# Patient Record
Sex: FEMALE | Race: OTHER | NOT HISPANIC OR LATINO
[De-identification: names, ages, dates, MRNs, and addresses within clinical notes are randomized per-mention and may not be internally consistent; named-entity substitution may affect disease eponyms.]

---

## 2022-07-25 ENCOUNTER — FORM ENCOUNTER (OUTPATIENT)
Age: 52
End: 2022-07-25

## 2022-12-20 PROBLEM — Z00.00 ENCOUNTER FOR PREVENTIVE HEALTH EXAMINATION: Status: ACTIVE | Noted: 2022-12-20

## 2023-01-10 ENCOUNTER — APPOINTMENT (OUTPATIENT)
Dept: ORTHOPEDIC SURGERY | Facility: CLINIC | Age: 53
End: 2023-01-10
Payer: MEDICAID

## 2023-01-10 VITALS — WEIGHT: 112 LBS | BODY MASS INDEX: 21.99 KG/M2 | HEIGHT: 60 IN

## 2023-01-10 DIAGNOSIS — R56.9 UNSPECIFIED CONVULSIONS: ICD-10-CM

## 2023-01-10 DIAGNOSIS — M71.21 SYNOVIAL CYST OF POPLITEAL SPACE [BAKER], RIGHT KNEE: ICD-10-CM

## 2023-01-10 DIAGNOSIS — Z87.39 PERSONAL HISTORY OF OTHER DISEASES OF THE MUSCULOSKELETAL SYSTEM AND CONNECTIVE TISSUE: ICD-10-CM

## 2023-01-10 DIAGNOSIS — M25.561 PAIN IN RIGHT KNEE: ICD-10-CM

## 2023-01-10 DIAGNOSIS — M81.8 OTHER OSTEOPOROSIS W/OUT CURRENT PATHOLOGICAL FRACTURE: ICD-10-CM

## 2023-01-10 DIAGNOSIS — M25.562 PAIN IN RIGHT KNEE: ICD-10-CM

## 2023-01-10 DIAGNOSIS — M71.22 SYNOVIAL CYST OF POPLITEAL SPACE [BAKER], LEFT KNEE: ICD-10-CM

## 2023-01-10 DIAGNOSIS — Z78.9 OTHER SPECIFIED HEALTH STATUS: ICD-10-CM

## 2023-01-10 PROCEDURE — 99203 OFFICE O/P NEW LOW 30 MIN: CPT | Mod: 25

## 2023-01-10 PROCEDURE — 20612 ASPIRATE/INJ GANGLION CYST: CPT

## 2023-01-10 RX ORDER — MELOXICAM 15 MG/1
TABLET ORAL
Refills: 0 | Status: ACTIVE | COMMUNITY

## 2023-01-10 RX ORDER — PREGABALIN 300 MG/1
CAPSULE ORAL
Refills: 0 | Status: ACTIVE | COMMUNITY

## 2023-01-10 RX ORDER — BUTALB/ACETAMINOPHEN/CAFFEINE 50-325-40
TABLET ORAL
Refills: 0 | Status: ACTIVE | COMMUNITY

## 2023-01-10 RX ORDER — QUETIAPINE FUMARATE 25 MG/1
25 TABLET ORAL
Refills: 0 | Status: ACTIVE | COMMUNITY

## 2023-01-10 NOTE — HISTORY OF PRESENT ILLNESS
[FreeTextEntry1] : This is a 52-year-old female who comes in complaining of bilateral knee pain in association with her low back pain.  She has known spinal stenosis as well as known bilateral popliteal cysts.  She also has known meniscal tears and some degenerative changes.  She was told by another doctor that they would not do a resection on her popliteal cyst and was sent to me for further evaluation.  She has pain when she is kneeling. [Worsening] : worsening [8] : currently ~his/her~ pain is 8 out of 10

## 2023-01-10 NOTE — PHYSICAL EXAM
[FreeTextEntry1] : On exam the patient stands in good balance.  She does have generalized pain including going down the right leg.  She has bilateral knee pain medial more than lateral as well as some tightness posteriorly from the popliteal cyst.  She has range of motion of 0 to 125 degrees in both knees.  She has some subjective paresthesias but no obvious objective neurologic findings.  She has palpable fullness in the back of both legs consistent with popliteal cyst. [General Appearance - Well-Appearing] : Well appearing [General Appearance - Well Nourished] : well nourished [Oriented To Time, Place, And Person] : Oriented to person, place, and time [Impaired Insight] : Insight and judgment were intact [Affect] : The affect was normal. [Mood] : the mood was normal [Sclera] : the sclera and conjunctiva were normal [Neck Cervical Mass (___cm)] : no neck mass was observed [Heart Rate And Rhythm] : heart rate was normal and rhythm regular [] : No respiratory distress [Abdomen Soft] : Soft [Antalgic Gait Right] : antalgic on the right [Antalgic Gait Left] : antalgic on the left [Tenderness] : tenderness [Swelling] : swelling [Skin Changes - Describe changes:] : No skin changes noted [Normal] : Palpable DP and PT pulses, warm and pink with brisk capillary refill [Full ROM Unless otherwise noted:] : Full range of motion unless otherwise noted:

## 2023-01-10 NOTE — REVIEW OF SYSTEMS
[Chills] : chills [Feeling Tired] : feeling tired [Pain] : pain [Abdominal Pain] : abdominal pain [Joint Pain] : joint pain [Joint Stiffness] : joint stiffness [Joint Swelling] : joint swelling [Breast Pain] : breast pain [Headache] : headache [Dizziness] : dizziness [Fainting] : fainting [Anxiety] : anxiety [Depression] : depression [Swollen Glands] : swollen glands

## 2023-01-10 NOTE — PROCEDURE
Called pt and reviewed request. She is under care of RE but reports her primary MD is out on maternity leave. She has not contacted them yet. Advised she can come in for HCG today and ordered. Recommended she contact RE since they typically follow through first trimester. Pt took urine preg test and was neg today but she wants to proceed with blood testing. Advised her to call if she does not receive results tomorrow.    [Aspiration] : Aspiration [Bilateral] : of bilateral [Baker's Cyst] : Baker's cyst [Joint Pain] : Joint pain [Bleeding] : bleeding [Patient] : patient [Risk] : risk [Benefits] : benefits [Alternatives] : alternatives [Verbal Consent Obtained] : verbal consent was obtained prior to the procedure [Alcohol] : Alcohol [Ethyl Chloride Spray] : ethyl chloride spray was used as a topical anesthetic [Ultrasound Guided] : The procedure was ultrasound guided.   [Posterior] : posterior [Direct] : direct [18] : an 18-gauge [1.5  inch] : 1.5 inch needle

## 2023-01-10 NOTE — DISCUSSION/SUMMARY
[de-identified] : We discussed that this popliteal cysts are likely related to her meniscal tears which are degenerative and there is nothing I can do to make them go away.  She also understands that with simple aspiration she is likely to recur.  Even if I do a surgery with resecting them and tying off the back it still is likely to recur because of the meniscal tears.  In my experience doing surgery on the is is very unsatisfying and continues with the same symptoms based on the pathology.  She is going to return to her primary orthopedist that she does not need orthopedic oncology.  Follow-up again as needed.\par \par If imaging was ordered, the patient was told to make an appointment to review findings right after all imaging is completed.\par \par We discussed risks, benefits and alternatives. Rationale of care was reviewed and all questions were answered. Patient (and family) had all questions answered to her degree of the level of satisfaction. Patient (and family) expressed understanding and interest in proceeding with the plan as outlined.\par \par \par \par \par This note was done with a voice recognition transcription software and any typos are related to this rather than medical error. Surgical risks reviewed. Patient (and family) had all questions answered to an agreeable level of satisfaction. Patient (and family) expressed understanding and interest in proceeding with the plan as outlined.  \par

## 2023-01-10 NOTE — DATA REVIEWED
[de-identified] : X-rays from approximately 7 months ago so some mild degenerative changes in the area of the bilateral knees medial more than lateral.  MRI scan both knees from July 2022 shows significant degenerative meniscal tears especially medially and bilateral popliteal cysts.\par \par Focused ultrasound was used in order to find both popliteal cysts and get needles and therefore aspiration.

## 2023-07-03 ENCOUNTER — OFFICE (OUTPATIENT)
Dept: URBAN - METROPOLITAN AREA CLINIC 76 | Facility: CLINIC | Age: 53
Setting detail: OPHTHALMOLOGY
End: 2023-07-03
Payer: MEDICAID

## 2023-07-03 DIAGNOSIS — H43.23: ICD-10-CM

## 2023-07-03 DIAGNOSIS — H11.153: ICD-10-CM

## 2023-07-03 DIAGNOSIS — H52.4: ICD-10-CM

## 2023-07-03 DIAGNOSIS — H35.013: ICD-10-CM

## 2023-07-03 DIAGNOSIS — Q13.2: ICD-10-CM

## 2023-07-03 DIAGNOSIS — H25.13: ICD-10-CM

## 2023-07-03 PROCEDURE — 92014 COMPRE OPH EXAM EST PT 1/>: CPT | Performed by: OPHTHALMOLOGY

## 2023-07-03 PROCEDURE — 92015 DETERMINE REFRACTIVE STATE: CPT | Performed by: OPHTHALMOLOGY

## 2023-07-03 PROCEDURE — 92201 OPSCPY EXTND RTA DRAW UNI/BI: CPT | Performed by: OPHTHALMOLOGY

## 2023-07-03 ASSESSMENT — AXIALLENGTH_DERIVED
OS_AL: 23.4261
OD_AL: 23.2383
OS_AL: 23.4743
OD_AL: 23.3332
OS_AL: 23.3305

## 2023-07-03 ASSESSMENT — REFRACTION_AUTOREFRACTION
OS_SPHERE: -0.25
OD_CYLINDER: -0.25
OS_CYLINDER: -0.75
OS_AXIS: 076
OD_CYLINDER: -0.25
OD_SPHERE: -0.50
OD_SPHERE: -0.25
OS_SPHERE: -0.50
OS_AXIS: 12
OD_AXIS: 168
OD_AXIS: 157
OS_CYLINDER: -0.50

## 2023-07-03 ASSESSMENT — KERATOMETRY
OS_K2POWER_DIOPTERS: 45.00
OS_AXISANGLE_DEGREES: 115
OD_K1POWER_DIOPTERS: 44.50
OS_K1POWER_DIOPTERS: 44.50
OD_AXISANGLE_DEGREES: 072
OD_K2POWER_DIOPTERS: 45.25

## 2023-07-03 ASSESSMENT — VISUAL ACUITY
OS_BCVA: 20/20-1
OD_BCVA: 20/40

## 2023-07-03 ASSESSMENT — LID EXAM ASSESSMENTS
OD_BLEPHARITIS: 1+
OS_BLEPHARITIS: 1+

## 2023-07-03 ASSESSMENT — REFRACTION_MANIFEST
OD_CYLINDER: SPH
OS_VA1: 20/25
OD_ADD: +2.25
OS_SPHERE: -0.50
OS_ADD: +2.25
OD_VA1: 20/20-1
OU_VA: 20/25
OS_SPHERE: -0.50
OS_AXIS: 10
OD_VA2: 20/25(J1)
OS_CYLINDER: -0.50
OD_CYLINDER: SPH
OD_SPHERE: -0.50
OD_AXIS: 000
OD_SPHERE: PLANO
OS_VA2: 20/25(J1)
OS_VA1: 20/25-2
OD_VA1: 20/25
OS_CYLINDER: SPH

## 2023-07-03 ASSESSMENT — TONOMETRY
OS_IOP_MMHG: 12
OD_IOP_MMHG: 10

## 2023-07-03 ASSESSMENT — CONFRONTATIONAL VISUAL FIELD TEST (CVF)
OD_FINDINGS: FULL
OS_FINDINGS: FULL

## 2023-07-03 ASSESSMENT — SPHEQUIV_DERIVED
OD_SPHEQUIV: -0.375
OS_SPHEQUIV: -0.5
OD_SPHEQUIV: -0.625
OS_SPHEQUIV: -0.75
OS_SPHEQUIV: -0.875

## 2023-07-03 ASSESSMENT — SUPERFICIAL PUNCTATE KERATITIS (SPK)
OS_SPK: 3+
OD_SPK: 3+

## 2023-08-07 ENCOUNTER — OFFICE (OUTPATIENT)
Dept: URBAN - METROPOLITAN AREA CLINIC 76 | Facility: CLINIC | Age: 53
Setting detail: OPHTHALMOLOGY
End: 2023-08-07
Payer: MEDICAID

## 2023-08-07 ENCOUNTER — RX ONLY (RX ONLY)
Age: 53
End: 2023-08-07

## 2023-08-07 DIAGNOSIS — H16.213: ICD-10-CM

## 2023-08-07 DIAGNOSIS — H16.223: ICD-10-CM

## 2023-08-07 PROCEDURE — 92012 INTRM OPH EXAM EST PATIENT: CPT | Performed by: OPHTHALMOLOGY

## 2023-08-07 ASSESSMENT — LID EXAM ASSESSMENTS
OS_BLEPHARITIS: 1+
OD_BLEPHARITIS: 1+

## 2023-08-07 ASSESSMENT — AXIALLENGTH_DERIVED
OD_AL: 23.2383
OS_AL: 23.4261
OS_AL: 23.4743
OS_AL: 23.3305
OD_AL: 23.3332

## 2023-08-07 ASSESSMENT — CONFRONTATIONAL VISUAL FIELD TEST (CVF)
OD_FINDINGS: FULL
OS_FINDINGS: FULL

## 2023-08-07 ASSESSMENT — REFRACTION_MANIFEST
OD_VA1: 20/25
OD_CYLINDER: SPH
OS_CYLINDER: -0.50
OS_VA2: 20/25(J1)
OD_VA2: 20/25(J1)
OS_SPHERE: -0.50
OS_CYLINDER: SPH
OU_VA: 20/25
OS_AXIS: 10
OD_VA1: 20/20-1
OD_AXIS: 000
OS_VA1: 20/25
OD_SPHERE: PLANO
OD_ADD: +2.25
OD_SPHERE: -0.50
OS_ADD: +2.25
OS_SPHERE: -0.50
OD_CYLINDER: SPH
OS_VA1: 20/25-2

## 2023-08-07 ASSESSMENT — VISUAL ACUITY
OS_BCVA: 20/30
OD_BCVA: 20/30

## 2023-08-07 ASSESSMENT — KERATOMETRY
OS_K2POWER_DIOPTERS: 45.00
OS_K1POWER_DIOPTERS: 44.50
OD_K1POWER_DIOPTERS: 44.50
OD_K2POWER_DIOPTERS: 45.25
OS_AXISANGLE_DEGREES: 115
OD_AXISANGLE_DEGREES: 072

## 2023-08-07 ASSESSMENT — SUPERFICIAL PUNCTATE KERATITIS (SPK)
OD_SPK: 3+
OS_SPK: 3+

## 2023-08-07 ASSESSMENT — SPHEQUIV_DERIVED
OD_SPHEQUIV: -0.625
OS_SPHEQUIV: -0.5
OD_SPHEQUIV: -0.375
OS_SPHEQUIV: -0.75
OS_SPHEQUIV: -0.875

## 2023-08-07 ASSESSMENT — TONOMETRY
OS_IOP_MMHG: 15
OD_IOP_MMHG: 21
OD_IOP_MMHG: 14
OS_IOP_MMHG: 20

## 2023-08-07 ASSESSMENT — REFRACTION_AUTOREFRACTION
OD_AXIS: 168
OS_SPHERE: -0.25
OS_SPHERE: -0.50
OD_AXIS: 157
OD_SPHERE: -0.50
OS_AXIS: 076
OD_CYLINDER: -0.25
OS_AXIS: 12
OD_SPHERE: -0.25
OD_CYLINDER: -0.25
OS_CYLINDER: -0.75
OS_CYLINDER: -0.50

## 2023-08-07 ASSESSMENT — TEAR BREAK UP TIME (TBUT)
OS_TBUT: 5
OD_TBUT: 5